# Patient Record
Sex: FEMALE | Race: WHITE | NOT HISPANIC OR LATINO | Employment: UNEMPLOYED | URBAN - METROPOLITAN AREA
[De-identification: names, ages, dates, MRNs, and addresses within clinical notes are randomized per-mention and may not be internally consistent; named-entity substitution may affect disease eponyms.]

---

## 2020-03-12 PROBLEM — Z98.51 HISTORY OF BILATERAL TUBAL LIGATION: Status: ACTIVE | Noted: 2020-03-12

## 2020-03-12 PROBLEM — Z98.890 HISTORY OF ENDOMETRIAL ABLATION: Status: ACTIVE | Noted: 2020-03-12

## 2020-03-12 PROBLEM — N92.1 MENORRHAGIA WITH IRREGULAR CYCLE: Status: ACTIVE | Noted: 2020-03-12

## 2020-03-12 PROBLEM — Z98.891 HISTORY OF 2 CESAREAN SECTIONS: Status: ACTIVE | Noted: 2020-03-12

## 2020-03-12 PROBLEM — Z90.49 HISTORY OF LAPAROSCOPIC APPENDECTOMY: Status: ACTIVE | Noted: 2020-03-12

## 2020-03-12 PROBLEM — F41.9 ANXIETY: Status: ACTIVE | Noted: 2020-03-12

## 2020-03-12 PROBLEM — F43.10 PTSD (POST-TRAUMATIC STRESS DISORDER): Status: ACTIVE | Noted: 2020-03-12

## 2020-03-12 PROBLEM — N94.6 DYSMENORRHEA: Status: ACTIVE | Noted: 2020-03-12

## 2020-03-12 PROBLEM — I10 CHRONIC HYPERTENSION: Status: ACTIVE | Noted: 2020-03-12

## 2020-03-12 PROBLEM — Z90.49 HISTORY OF LAPAROSCOPIC CHOLECYSTECTOMY: Status: ACTIVE | Noted: 2020-03-12

## 2020-05-27 PROBLEM — N92.0 MENORRHAGIA: Status: ACTIVE | Noted: 2020-05-27

## 2020-05-27 PROBLEM — Z90.710 S/P LAPAROSCOPIC HYSTERECTOMY: Status: ACTIVE | Noted: 2020-05-27

## 2020-06-09 ENCOUNTER — NURSE TRIAGE (OUTPATIENT)
Dept: ADMINISTRATIVE | Facility: CLINIC | Age: 35
End: 2020-06-09

## 2020-06-09 NOTE — TELEPHONE ENCOUNTER
Pt contacted through the Post Procedural Symptom Tracker. No answer. No additional contact today as per post procedure protocol.    Reason for Disposition   No answer.  First attempt to contact caller.  Follow-up call scheduled within 15 minutes.    Additional Information   Negative: Caller is angry or rude (e.g., hangs up, verbally abusive, yelling)   Negative: Caller hangs up   Negative: Caller has already spoken with the PCP and has no further questions.   Negative: Caller has already spoken with another triager and has no further questions.   Negative: Caller has already spoken with another triager or PCP AND has further questions AND triager able to answer questions.    Protocols used: ST NO CONTACT OR DUPLICATE CONTACT CALL-A-AH

## 2020-06-15 NOTE — TELEPHONE ENCOUNTER
Pt contacted through the Post Procedural Symptom Tracker. No answer. No additional contact today as per post procedure protocol.  Reason for Disposition   Second attempt to contact family AND no contact made.  Answering service notified.    Protocols used: ST NO CONTACT OR DUPLICATE CONTACT [email or web address]